# Patient Record
Sex: FEMALE | URBAN - NONMETROPOLITAN AREA
[De-identification: names, ages, dates, MRNs, and addresses within clinical notes are randomized per-mention and may not be internally consistent; named-entity substitution may affect disease eponyms.]

---

## 2022-03-10 ENCOUNTER — OFFICE VISIT (OUTPATIENT)
Dept: URBAN - NONMETROPOLITAN AREA CLINIC 5 | Facility: CLINIC | Age: 72
End: 2022-03-10

## 2022-03-10 VITALS — WEIGHT: 113 LBS | HEART RATE: 96 BPM | HEIGHT: 62 IN | OXYGEN SATURATION: 97 % | BODY MASS INDEX: 20.8 KG/M2

## 2022-03-10 RX ORDER — BUPROPION HYDROCHLORIDE 150 MG/1
1 TABLET IN THE MORNING TABLET, FILM COATED, EXTENDED RELEASE ORAL ONCE A DAY
Status: ACTIVE | COMMUNITY

## 2022-03-10 RX ORDER — DENOSUMAB 60 MG/ML
AS DIRECTED INJECTION SUBCUTANEOUS
Status: ACTIVE | COMMUNITY

## 2022-03-10 RX ORDER — BACILLUS COAGULANS/INULIN 1B-250 MG
AS DIRECTED CAPSULE ORAL
Status: ACTIVE | COMMUNITY

## 2022-03-10 RX ORDER — CHOLECALCIFEROL (VITAMIN D3) 50 MCG
1 TABLET TABLET ORAL ONCE A DAY
Status: ACTIVE | COMMUNITY

## 2022-03-10 RX ORDER — ROSUVASTATIN CALCIUM 20 MG/1
1 TABLET TABLET, FILM COATED ORAL ONCE A DAY
Status: ACTIVE | COMMUNITY

## 2022-03-10 NOTE — HPI-CHANGE IN BOWEL HABITS
71 year old female presents today for a consult for change in bowel habits. Her symptoms have been present for 2 years. She can have diarrhea and then no BM in one day. She states it's the consistency of her BM. She doesn't have a complete BM. Currently, has about 5-6 incomplete BM's a day, stools vary from normal to watery diarrhea no blood, mucus or melena. She is currently taking Metamucil. She has had melena stool, last occurrence was last week. She has had bright red blood in her stool, last occurrence was about a month ago. She is here for a second opinion, she has been diagnosed with celiac disease in 09/2021, she had her last EGD on 03/31/2021 and last colonoscopy with a h/o colon polyps in 2019. her last GI Dr Moni Arellano at [facility].

## 2022-03-29 ENCOUNTER — OFFICE VISIT (OUTPATIENT)
Dept: URBAN - METROPOLITAN AREA CLINIC 35 | Facility: CLINIC | Age: 72
End: 2022-03-29

## 2022-05-12 ENCOUNTER — TELEPHONE ENCOUNTER (OUTPATIENT)
Dept: URBAN - METROPOLITAN AREA CLINIC 36 | Facility: CLINIC | Age: 72
End: 2022-05-12

## 2022-05-17 ENCOUNTER — OFFICE VISIT (OUTPATIENT)
Dept: URBAN - METROPOLITAN AREA CLINIC 35 | Facility: CLINIC | Age: 72
End: 2022-05-17

## 2022-07-19 ENCOUNTER — LAB OUTSIDE AN ENCOUNTER (OUTPATIENT)
Dept: URBAN - METROPOLITAN AREA CLINIC 35 | Facility: CLINIC | Age: 72
End: 2022-07-19

## 2022-07-19 ENCOUNTER — OFFICE VISIT (OUTPATIENT)
Dept: URBAN - METROPOLITAN AREA CLINIC 35 | Facility: CLINIC | Age: 72
End: 2022-07-19
Payer: MEDICARE

## 2022-07-19 VITALS
RESPIRATION RATE: 80 BRPM | DIASTOLIC BLOOD PRESSURE: 78 MMHG | OXYGEN SATURATION: 96 % | HEIGHT: 61 IN | BODY MASS INDEX: 21.6 KG/M2 | WEIGHT: 114.4 LBS | SYSTOLIC BLOOD PRESSURE: 110 MMHG

## 2022-07-19 DIAGNOSIS — Z87.19 HISTORY OF IBS: ICD-10-CM

## 2022-07-19 DIAGNOSIS — K90.0 CELIAC DISEASE: ICD-10-CM

## 2022-07-19 DIAGNOSIS — Z86.010 HX OF COLONIC POLYPS: ICD-10-CM

## 2022-07-19 DIAGNOSIS — K62.5 RECTAL BLEEDING: ICD-10-CM

## 2022-07-19 DIAGNOSIS — K52.9 CHRONIC DIARRHEA: ICD-10-CM

## 2022-07-19 DIAGNOSIS — R63.4 WEIGHT LOSS: ICD-10-CM

## 2022-07-19 PROCEDURE — 99204 OFFICE O/P NEW MOD 45 MIN: CPT | Performed by: INTERNAL MEDICINE

## 2022-07-19 RX ORDER — ALPRAZOLAM 0.25 MG/1
1 TABLET TABLET ORAL
Status: ACTIVE | COMMUNITY

## 2022-07-19 RX ORDER — DENOSUMAB 60 MG/ML
AS DIRECTED INJECTION SUBCUTANEOUS
Status: ACTIVE | COMMUNITY

## 2022-07-19 RX ORDER — ROSUVASTATIN CALCIUM 20 MG/1
1 TABLET TABLET, FILM COATED ORAL ONCE A DAY
Status: ACTIVE | COMMUNITY

## 2022-07-19 RX ORDER — BUPROPION HYDROCHLORIDE 150 MG/1
1 TABLET IN THE MORNING TABLET, FILM COATED, EXTENDED RELEASE ORAL ONCE A DAY
Status: ACTIVE | COMMUNITY

## 2022-07-19 RX ORDER — BACILLUS COAGULANS/INULIN 1B-250 MG
AS DIRECTED CAPSULE ORAL
Status: DISCONTINUED | COMMUNITY

## 2022-07-19 RX ORDER — SODIUM PICOSULFATE, MAGNESIUM OXIDE, AND ANHYDROUS CITRIC ACID 10; 3.5; 12 MG/160ML; G/160ML; G/160ML
160 ML LIQUID ORAL
Qty: 320 MILLILITER | Refills: 0 | OUTPATIENT
Start: 2022-07-19 | End: 2022-07-20

## 2022-07-19 RX ORDER — CHOLECALCIFEROL (VITAMIN D3) 50 MCG
1 TABLET TABLET ORAL ONCE A DAY
Status: ACTIVE | COMMUNITY

## 2022-07-19 NOTE — PHYSICAL EXAM CHEST:
no lesions,  no deformities,  no traumatic injuries, chest wall non-tender,  no masses present, breathing is unlabored without accessory muscle use, normal breath sounds

## 2022-07-19 NOTE — PHYSICAL EXAM NECK/THYROID:
normal appearance , without tenderness upon palpation , no deformities , trachea midline , Thyroid normal size , no thyroid nodules , no masses , no JVD , thyroid nontender

## 2022-07-19 NOTE — HPI-TODAY'S VISIT:
73 y/o female patient presents today for a consultation for celiac disease. She admits a recent/longstanding history of celiac disease. Her most recent labs were completed on 03.31.2021. Symptoms are  abdominal pain diarrhea, constipation, weight loss, irritability  Patient c/o sleep interruption by these symptoms   Denies any family history of colon cancer, esophageal or gastric diseases.   Patient c/o poor emptying with BM's. Bowels movement are between 4 and 8 per day some associated  blood and mucus.  Currently, has about 5-6 incomplete BM's a day, stools vary from normal to watery diarrhea. She is here for a second opinion, she has been diagnosed with celiac disease in 09/2021, she had her last EGD on 03/31/2021 and last colonoscopy with a h/o colon polyps in 2019. Her last GI Dr Moni Arellano at [facility].   GI MD Note: patient has a diagnosis of celiac disease. On presentation she was having weight loss, approximately 10 #. Patient was diagnosed  also with IBS many years ago. Mother also had IBS with bouts of postprandial diarrhea. The difference is that for the last 2 yrs she has been having nocturnal BM's with loose stools. This is happening every night. Mild intermittent crampy abdominal discomfort; not often. She does have loose BM's during the day. At night she has 4-6 BM's a day. Occasional blood in stool, sometimes in toilet bowl and sometimes just on tissue. She does have hemorrhoids. No N/V No GERD symptoms No dysphagia Pruritus at night but no rash No J/AS/DU No melena or hematemesis. Occasional NSAIDs No PPI Hx of colon polyps, last colon 2019. Has had multiple colonoscopies for hx of colon polyps.

## 2022-07-19 NOTE — PHYSICAL EXAM SKIN:
no rashes , no suspicious lesions , no areas of discoloration , no jaundice present , good turgor , no masses , no tenderness on palpation

## 2022-07-19 NOTE — PHYSICAL EXAM CONSTITUTIONAL:
well developed, well nourished , in no acute distress , ambulating without difficulty , normal communication ability

## 2022-08-02 ENCOUNTER — LAB OUTSIDE AN ENCOUNTER (OUTPATIENT)
Dept: URBAN - METROPOLITAN AREA CLINIC 35 | Facility: CLINIC | Age: 72
End: 2022-08-02

## 2022-08-08 ENCOUNTER — CLAIMS CREATED FROM THE CLAIM WINDOW (OUTPATIENT)
Dept: URBAN - METROPOLITAN AREA CLINIC 4 | Facility: CLINIC | Age: 72
End: 2022-08-08
Payer: MEDICARE

## 2022-08-08 ENCOUNTER — OFFICE VISIT (OUTPATIENT)
Dept: URBAN - METROPOLITAN AREA SURGERY CENTER 8 | Facility: SURGERY CENTER | Age: 72
End: 2022-08-08
Payer: MEDICARE

## 2022-08-08 DIAGNOSIS — K90.0 ACD (ADULT CELIAC DISEASE): ICD-10-CM

## 2022-08-08 DIAGNOSIS — K63.89 OTHER SPECIFIED DISEASES OF INTESTINE: ICD-10-CM

## 2022-08-08 DIAGNOSIS — K29.80 ACUTE DUODENITIS: ICD-10-CM

## 2022-08-08 DIAGNOSIS — K52.832 LYMPHOCYTIC  COLITIS: ICD-10-CM

## 2022-08-08 DIAGNOSIS — D12.2 ADENOMA OF ASCENDING COLON: ICD-10-CM

## 2022-08-08 DIAGNOSIS — D12.3 ADENOMA OF TRANSVERSE COLON: ICD-10-CM

## 2022-08-08 DIAGNOSIS — K52.832 LYMPHOCYTIC COLITIS: ICD-10-CM

## 2022-08-08 DIAGNOSIS — K29.81 DUODENITIS WITH BLEEDING: ICD-10-CM

## 2022-08-08 DIAGNOSIS — K29.60 ADENOPAPILLOMATOSIS GASTRICA: ICD-10-CM

## 2022-08-08 DIAGNOSIS — R19.7 ACUTE DIARRHEA: ICD-10-CM

## 2022-08-08 DIAGNOSIS — D12.2 BENIGN NEOPLASM OF ASCENDING COLON: ICD-10-CM

## 2022-08-08 DIAGNOSIS — D12.3 BENIGN NEOPLASM OF TRANSVERSE COLON: ICD-10-CM

## 2022-08-08 DIAGNOSIS — K31.A0 GASTRIC INTESTINAL METAPLASIA, UNSPECIFIED: ICD-10-CM

## 2022-08-08 PROCEDURE — 45380 COLONOSCOPY AND BIOPSY: CPT | Performed by: INTERNAL MEDICINE

## 2022-08-08 PROCEDURE — 43239 EGD BIOPSY SINGLE/MULTIPLE: CPT | Performed by: INTERNAL MEDICINE

## 2022-08-08 PROCEDURE — 88313 SPECIAL STAINS GROUP 2: CPT | Performed by: PATHOLOGY

## 2022-08-08 PROCEDURE — 88342 IMHCHEM/IMCYTCHM 1ST ANTB: CPT | Performed by: PATHOLOGY

## 2022-08-08 PROCEDURE — G8907 PT DOC NO EVENTS ON DISCHARG: HCPCS | Performed by: INTERNAL MEDICINE

## 2022-08-08 PROCEDURE — 88305 TISSUE EXAM BY PATHOLOGIST: CPT | Performed by: PATHOLOGY

## 2022-08-09 LAB
C. DIFFICILE TOXIN A/B, STOOL - QDX: (no result)
CALPROTECTIN, STOOL - QDX: (no result)
FECAL FAT, QUALITATIVE: (no result)
GASTROINTESTINAL PATHOGEN: (no result)
OVA AND PARASITE - QDX: (no result)
PANCREATICELASTASE ELISA, STOOL: (no result)

## 2022-08-11 ENCOUNTER — TELEPHONE ENCOUNTER (OUTPATIENT)
Dept: URBAN - METROPOLITAN AREA CLINIC 36 | Facility: CLINIC | Age: 72
End: 2022-08-11

## 2022-08-15 PROBLEM — 31437008 LYMPHOCYTIC COLITIS: Status: ACTIVE | Noted: 2022-08-15

## 2022-09-01 ENCOUNTER — OFFICE VISIT (OUTPATIENT)
Dept: URBAN - METROPOLITAN AREA CLINIC 33 | Facility: CLINIC | Age: 72
End: 2022-09-01
Payer: MEDICARE

## 2022-09-01 VITALS
SYSTOLIC BLOOD PRESSURE: 118 MMHG | WEIGHT: 115 LBS | OXYGEN SATURATION: 96 % | DIASTOLIC BLOOD PRESSURE: 78 MMHG | BODY MASS INDEX: 21.71 KG/M2 | HEART RATE: 110 BPM | HEIGHT: 61 IN

## 2022-09-01 DIAGNOSIS — K52.832 LYMPHOCYTIC COLITIS: ICD-10-CM

## 2022-09-01 DIAGNOSIS — K29.70 INFLAMMATION PRESENT ON BIOPSY OF STOMACH: ICD-10-CM

## 2022-09-01 DIAGNOSIS — Z86.010 HX OF ADENOMATOUS COLONIC POLYPS: ICD-10-CM

## 2022-09-01 DIAGNOSIS — K90.0 CELIAC DISEASE: ICD-10-CM

## 2022-09-01 DIAGNOSIS — K52.9 CHRONIC DIARRHEA: ICD-10-CM

## 2022-09-01 PROBLEM — 4556007: Status: ACTIVE | Noted: 2022-09-01

## 2022-09-01 PROBLEM — 429047008: Status: ACTIVE | Noted: 2022-09-01

## 2022-09-01 PROCEDURE — 99214 OFFICE O/P EST MOD 30 MIN: CPT | Performed by: INTERNAL MEDICINE

## 2022-09-01 RX ORDER — DENOSUMAB 60 MG/ML
AS DIRECTED INJECTION SUBCUTANEOUS
Status: ACTIVE | COMMUNITY

## 2022-09-01 RX ORDER — CHOLECALCIFEROL (VITAMIN D3) 50 MCG
1 TABLET TABLET ORAL ONCE A DAY
Status: ACTIVE | COMMUNITY

## 2022-09-01 RX ORDER — BUPROPION HYDROCHLORIDE 150 MG/1
1 TABLET IN THE MORNING TABLET, FILM COATED, EXTENDED RELEASE ORAL BID
Status: ACTIVE | COMMUNITY

## 2022-09-01 RX ORDER — ALPRAZOLAM 0.25 MG/1
1 TABLET TABLET ORAL
Status: ACTIVE | COMMUNITY

## 2022-09-01 RX ORDER — ROSUVASTATIN CALCIUM 10 MG/1
1 TABLET TABLET, FILM COATED ORAL ONCE A DAY
Status: ACTIVE | COMMUNITY

## 2022-09-01 RX ORDER — BUDESONIDE 3 MG/1
3 CAPSULES CAPSULE, COATED PELLETS ORAL ONCE A DAY
Qty: 270 CAPSULE | Refills: 0 | OUTPATIENT
Start: 2022-09-01

## 2022-09-01 NOTE — HPI-TODAY'S VISIT:
Patient presents today to review her colonoscopy and endoscopy results. She denies any complications after her procedure.   Since her procedure she admits/denies any episodes of dysphagia, a globus sensation, a change in appetite or bowel habits.   Currently reports 3-6 bowel movement per day, with strain. Her stools alternate between formed with the first movement and the looser which each movement following. She admit mucus present and denies melena or blood in stools.   Admits pruritus ani at night she states that she feels itching all over not just rectal itching. She denies rectal pain.  EGD (8/8/2022)  Impression:  - Z-line regular, 37 cm from the incisors. - Normal esophagus. - Erythematous mucosa in the stomach.  Biopsied. - Erythematous duodenopathy.  Biopsied  Pathology:  1.) Duodenum, Second Part (D2), Biopsy: PEPTIC DUODENITIS.  No Evidence of Infectious Organisms or Sprue.  Negative for Dysplasia or Malignancy.  2.) Stomach, Antrum and Body, Biopsy: CHRONIC INACTIVE GASTRITIS WITH HISTOLOGICAL CHANGES SUGGESTIVE OF              TREATED H. PYLORI GASTRITIS.  See Comment.   No Evidence of H. Pylori Organisms or Intestinal Metaplasia.  Negative For Dysplasia or Malignancy.   COMMENT: The biopsy shows band-like superficial and interstitial deeper lymphoplasmacytic infiltrate and variable presence of some lymphoid follicles, without acute inflammation.  No Helicobacter organisms are identified on H&E and special stains.  The histologic findings may represent past H. pylori gastritis treated with antibiotics and/or proton-pump inhibitors.  Non-invasive test (such as urea breath test, fecal antigen detection or serum antibody) might be helpful to completely exclude H. pylori infection if clinically indicated.  Colonoscopy (8/8/2022)  Impression:  - Perianal skin tags found on perianal exam. - The examined portion of the ileum was normal.  Biopsied. - Normal mucosa in the entire examined colon.  Biopsied. - Six 2 to 4 mm polyps in the rectum, at the hepatic flexure and in the proximal ascending colon, removed with a cold biopsy forceps.  Resected and retrieved. - Tortuous sigmoid colon. - Diverticulosis in the sigmoid colon, in the descending colon and in the transverse colon. - Medium-sized lipoma in the distal ascending colon. - Non-bleeding internal hemorrhoids    Pathology:  1.) Colon, Ascending, Biopsy: LYMPHOCYTIC COLITIS. Negative for Infectious Organisms, Dysplasia or Malignancy. 2.) Colon, Descending, Biopsy: LYMPHOCYTIC COLITIS. Negative for Infectious Organisms, Dysplasia or Malignancy. 3.) Colon, Ascending, Polypectomy: NO SIGNIFICANT ABNORMALITY. 4.) Colon, Ascending, Polypectomy: TUBULAR ADENOMA(S). 5.) Colon, Hepatic Flexure, Polypectomy: TUBULAR ADENOMA(S).

## 2022-09-16 ENCOUNTER — TELEPHONE ENCOUNTER (OUTPATIENT)
Dept: URBAN - METROPOLITAN AREA CLINIC 36 | Facility: CLINIC | Age: 72
End: 2022-09-16

## 2022-09-16 RX ORDER — BUDESONIDE 3 MG/1
3 CAPSULES CAPSULE, COATED PELLETS ORAL ONCE A DAY
Qty: 270 CAPSULE | Refills: 0
Start: 2022-09-01

## 2022-11-08 ENCOUNTER — OFFICE VISIT (OUTPATIENT)
Dept: URBAN - METROPOLITAN AREA CLINIC 35 | Facility: CLINIC | Age: 72
End: 2022-11-08

## 2022-12-14 ENCOUNTER — OFFICE VISIT (OUTPATIENT)
Dept: URBAN - METROPOLITAN AREA CLINIC 35 | Facility: CLINIC | Age: 72
End: 2022-12-14
Payer: MEDICARE

## 2022-12-14 VITALS
SYSTOLIC BLOOD PRESSURE: 116 MMHG | HEART RATE: 83 BPM | WEIGHT: 115 LBS | HEIGHT: 61 IN | BODY MASS INDEX: 21.71 KG/M2 | DIASTOLIC BLOOD PRESSURE: 64 MMHG | OXYGEN SATURATION: 97 %

## 2022-12-14 DIAGNOSIS — K52.832 LYMPHOCYTIC COLITIS: ICD-10-CM

## 2022-12-14 DIAGNOSIS — K90.0 CELIAC DISEASE: ICD-10-CM

## 2022-12-14 DIAGNOSIS — F41.8 SITUATIONAL ANXIETY: ICD-10-CM

## 2022-12-14 PROBLEM — 48694002: Status: ACTIVE | Noted: 2022-12-14

## 2022-12-14 PROBLEM — 396331005: Status: ACTIVE | Noted: 2022-07-19

## 2022-12-14 PROCEDURE — 99214 OFFICE O/P EST MOD 30 MIN: CPT | Performed by: INTERNAL MEDICINE

## 2022-12-14 RX ORDER — CHOLECALCIFEROL (VITAMIN D3) 50 MCG
1 TABLET TABLET ORAL ONCE A DAY
Status: ACTIVE | COMMUNITY

## 2022-12-14 RX ORDER — BUPROPION HYDROCHLORIDE 150 MG/1
1 TABLET IN THE MORNING TABLET, FILM COATED, EXTENDED RELEASE ORAL BID
Status: ACTIVE | COMMUNITY

## 2022-12-14 RX ORDER — ROSUVASTATIN CALCIUM 10 MG/1
1 TABLET TABLET, FILM COATED ORAL ONCE A DAY
Status: ACTIVE | COMMUNITY

## 2022-12-14 RX ORDER — ALPRAZOLAM 0.25 MG/1
1 TABLET TABLET ORAL
Status: ACTIVE | COMMUNITY

## 2022-12-14 RX ORDER — DENOSUMAB 60 MG/ML
AS DIRECTED INJECTION SUBCUTANEOUS
Status: ACTIVE | COMMUNITY

## 2022-12-14 RX ORDER — BUDESONIDE 3 MG/1
3 CAPSULES CAPSULE, COATED PELLETS ORAL ONCE A DAY
Qty: 270 CAPSULE | Refills: 0 | Status: ACTIVE | COMMUNITY
Start: 2022-09-01

## 2022-12-14 NOTE — HPI-TODAY'S VISIT:
72 Year old female patient presents today for 2 months follow up. She admits the continued use of Budesonide 9 mg and will start decreasing dosage as indicated. She admits improvement of symptoms (diarrhea), however, now that she has tapered down to one tablet a day (3 mg), her symptoms are returning. She also mentioned that she feels that she is having side effects from Budesonide (anger).  Currently, reports four to five BMs a day without strain, but those will happen in AM,  within 1.5 hours, then done for the day. Stools are formed Denies the presence of blood, mucus, nor melena.    Patient has not continued gluten free diet but is having a hard time with it.  Patient is under a lot of stress with the sickness of her  and feels this may be a large part of it;  patient states: "sometimes anger comes from fear, and I am afraid for my ."

## 2022-12-21 ENCOUNTER — OFFICE VISIT (OUTPATIENT)
Dept: URBAN - METROPOLITAN AREA TELEHEALTH 2 | Facility: TELEHEALTH | Age: 72
End: 2022-12-21
Payer: MEDICARE

## 2022-12-21 DIAGNOSIS — K52.832 LYMPHOCYTIC COLITIS: ICD-10-CM

## 2022-12-21 DIAGNOSIS — K90.0 ACD (ADULT CELIAC DISEASE): ICD-10-CM

## 2022-12-21 PROCEDURE — 97802 MEDICAL NUTRITION INDIV IN: CPT | Performed by: DIETITIAN, REGISTERED

## 2022-12-21 RX ORDER — ROSUVASTATIN CALCIUM 10 MG/1
1 TABLET TABLET, FILM COATED ORAL ONCE A DAY
Status: ACTIVE | COMMUNITY

## 2022-12-21 RX ORDER — CHOLECALCIFEROL (VITAMIN D3) 50 MCG
1 TABLET TABLET ORAL ONCE A DAY
Status: ACTIVE | COMMUNITY

## 2022-12-21 RX ORDER — ALPRAZOLAM 0.25 MG/1
1 TABLET TABLET ORAL
Status: ACTIVE | COMMUNITY

## 2022-12-21 RX ORDER — BUDESONIDE 3 MG/1
3 CAPSULES CAPSULE, COATED PELLETS ORAL ONCE A DAY
Qty: 270 CAPSULE | Refills: 0 | Status: ACTIVE | COMMUNITY
Start: 2022-09-01

## 2022-12-21 RX ORDER — BUPROPION HYDROCHLORIDE 150 MG/1
1 TABLET IN THE MORNING TABLET, FILM COATED, EXTENDED RELEASE ORAL BID
Status: ACTIVE | COMMUNITY

## 2022-12-21 RX ORDER — DENOSUMAB 60 MG/ML
AS DIRECTED INJECTION SUBCUTANEOUS
Status: ACTIVE | COMMUNITY

## 2022-12-30 PROBLEM — 1187071008: Status: ACTIVE | Noted: 2022-09-01

## 2023-03-14 ENCOUNTER — OFFICE VISIT (OUTPATIENT)
Dept: URBAN - METROPOLITAN AREA CLINIC 35 | Facility: CLINIC | Age: 73
End: 2023-03-14
Payer: MEDICARE

## 2023-03-14 VITALS
DIASTOLIC BLOOD PRESSURE: 66 MMHG | HEIGHT: 61 IN | HEART RATE: 86 BPM | WEIGHT: 111 LBS | SYSTOLIC BLOOD PRESSURE: 128 MMHG | OXYGEN SATURATION: 98 % | BODY MASS INDEX: 20.96 KG/M2

## 2023-03-14 DIAGNOSIS — K52.832 LYMPHOCYTIC COLITIS: ICD-10-CM

## 2023-03-14 DIAGNOSIS — K90.0 CELIAC DISEASE: ICD-10-CM

## 2023-03-14 DIAGNOSIS — K58.0 IRRITABLE BOWEL SYNDROME WITH DIARRHEA: ICD-10-CM

## 2023-03-14 DIAGNOSIS — F41.8 SITUATIONAL ANXIETY: ICD-10-CM

## 2023-03-14 PROBLEM — 197125005: Status: ACTIVE | Noted: 2023-03-14

## 2023-03-14 PROCEDURE — 99214 OFFICE O/P EST MOD 30 MIN: CPT | Performed by: INTERNAL MEDICINE

## 2023-03-14 RX ORDER — ROSUVASTATIN CALCIUM 10 MG/1
1 TABLET TABLET, FILM COATED ORAL ONCE A DAY
Status: ACTIVE | COMMUNITY

## 2023-03-14 RX ORDER — ALPRAZOLAM 0.25 MG/1
1 TABLET TABLET ORAL
Status: ACTIVE | COMMUNITY

## 2023-03-14 RX ORDER — DENOSUMAB 60 MG/ML
AS DIRECTED INJECTION SUBCUTANEOUS
Status: ACTIVE | COMMUNITY

## 2023-03-14 RX ORDER — BUDESONIDE 3 MG/1
3 CAPSULES CAPSULE, COATED PELLETS ORAL ONCE A DAY
Qty: 270 CAPSULE | Refills: 0 | Status: ON HOLD | COMMUNITY
Start: 2022-09-01

## 2023-03-14 RX ORDER — BUPROPION HYDROCHLORIDE 150 MG/1
1 TABLET IN THE MORNING TABLET, FILM COATED, EXTENDED RELEASE ORAL BID
Status: ACTIVE | COMMUNITY

## 2023-03-14 RX ORDER — CHOLECALCIFEROL (VITAMIN D3) 50 MCG
1 TABLET TABLET ORAL ONCE A DAY
Status: ACTIVE | COMMUNITY

## 2023-03-14 RX ORDER — RIFAXIMIN 550 MG/1
1 TABLET TABLET ORAL THREE TIMES A DAY
Qty: 42 TABLET | Refills: 1 | OUTPATIENT
Start: 2023-03-14 | End: 2023-04-11

## 2023-03-14 NOTE — HPI-TODAY'S VISIT:
72-Year-old female patient presents today for 3 months follow up. Patient admits consulting with GI Nutritionist, Bianca Murillo, and adhering to gluten free diet, she has not noticed difference in celiac symptoms. She admits completing Budesonide treatment, which provided relief while she was on medication. Her symptoms of fecal urgency during the night have returned.  She denies the use of Imodium. Patient states she wakes up during the middle of night with the urge of wanting to have a bowel movement without being able to. She admits she then gets up at 5 am, drinks a cup of coffee and then continues to have a bowel movement. Currently, reports 4-5 bowel movements a day, at one sitting, without strain. Admits fecal urgency in the morning and constipation in the evening. Stools vary between semi firm, loose and watery. Admits one episode of blood present in stool. Blood was bright red in a considerate amount. Denies the presence of mucus, nor melena.   Patient finished Budesonide mid December 2022. BM's starts with solid consistency, and progress to diarrhea.

## 2023-05-16 ENCOUNTER — OFFICE VISIT (OUTPATIENT)
Dept: URBAN - METROPOLITAN AREA CLINIC 35 | Facility: CLINIC | Age: 73
End: 2023-05-16

## 2023-07-26 ENCOUNTER — OFFICE VISIT (OUTPATIENT)
Dept: URBAN - METROPOLITAN AREA CLINIC 35 | Facility: CLINIC | Age: 73
End: 2023-07-26
Payer: MEDICARE

## 2023-07-26 VITALS
OXYGEN SATURATION: 97 % | HEIGHT: 61 IN | WEIGHT: 114 LBS | BODY MASS INDEX: 21.52 KG/M2 | HEART RATE: 77 BPM | SYSTOLIC BLOOD PRESSURE: 118 MMHG | DIASTOLIC BLOOD PRESSURE: 70 MMHG

## 2023-07-26 DIAGNOSIS — K52.832 LYMPHOCYTIC COLITIS: ICD-10-CM

## 2023-07-26 DIAGNOSIS — K90.0 CELIAC DISEASE: ICD-10-CM

## 2023-07-26 DIAGNOSIS — F41.8 SITUATIONAL ANXIETY: ICD-10-CM

## 2023-07-26 DIAGNOSIS — K58.0 IRRITABLE BOWEL SYNDROME WITH DIARRHEA: ICD-10-CM

## 2023-07-26 PROCEDURE — 99214 OFFICE O/P EST MOD 30 MIN: CPT | Performed by: INTERNAL MEDICINE

## 2023-07-26 RX ORDER — NALTREXONE HYDROCHLORIDE 50 MG/1
1 TABLET TABLET, FILM COATED ORAL ONCE A DAY
Status: ACTIVE | COMMUNITY

## 2023-07-26 RX ORDER — CHOLECALCIFEROL (VITAMIN D3) 50 MCG
1 TABLET TABLET ORAL ONCE A DAY
Status: ACTIVE | COMMUNITY

## 2023-07-26 RX ORDER — BUPROPION HYDROCHLORIDE 150 MG/1
1 TABLET IN THE MORNING TABLET, FILM COATED, EXTENDED RELEASE ORAL BID
Status: ACTIVE | COMMUNITY

## 2023-07-26 RX ORDER — DENOSUMAB 60 MG/ML
AS DIRECTED INJECTION SUBCUTANEOUS
Status: ACTIVE | COMMUNITY

## 2023-07-26 RX ORDER — BUDESONIDE 3 MG/1
3 CAPSULES CAPSULE, COATED PELLETS ORAL ONCE A DAY
Qty: 270 CAPSULE | Refills: 0 | Status: ON HOLD | COMMUNITY
Start: 2022-09-01

## 2023-07-26 RX ORDER — ROSUVASTATIN CALCIUM 10 MG/1
1 TABLET TABLET, FILM COATED ORAL ONCE A DAY
Status: ACTIVE | COMMUNITY

## 2023-07-26 RX ORDER — ALPRAZOLAM 0.25 MG/1
1 TABLET TABLET ORAL
Status: ACTIVE | COMMUNITY

## 2023-07-26 RX ORDER — TRAZODONE HYDROCHLORIDE 50 MG/1
1 TABLET AT BEDTIME AS NEEDED TABLET ORAL ONCE A DAY
Status: ACTIVE | COMMUNITY

## 2023-07-26 NOTE — HPI-TODAY'S VISIT:
73 Year old Female patient present today for follow up. Hx of Lymphocytic colitis. Overall she is better. Stools are more formed, but still having 4-6 BM's in AM after coffee. No symptoms at night. Sometimes (rarely) will feel in AM she has to have a BM, but can't.  She did a 2-week course of Xifaxan, but did not feel it helped much. Overall though she is better. Still under a lot of familiar stress still , with  illness.

## 2023-11-28 ENCOUNTER — OFFICE VISIT (OUTPATIENT)
Dept: URBAN - METROPOLITAN AREA CLINIC 35 | Facility: CLINIC | Age: 73
End: 2023-11-28

## 2023-12-12 ENCOUNTER — OFFICE VISIT (OUTPATIENT)
Dept: URBAN - METROPOLITAN AREA CLINIC 35 | Facility: CLINIC | Age: 73
End: 2023-12-12
Payer: MEDICARE

## 2023-12-12 ENCOUNTER — DASHBOARD ENCOUNTERS (OUTPATIENT)
Age: 73
End: 2023-12-12

## 2023-12-12 VITALS
BODY MASS INDEX: 20.77 KG/M2 | OXYGEN SATURATION: 95 % | HEIGHT: 61 IN | DIASTOLIC BLOOD PRESSURE: 66 MMHG | SYSTOLIC BLOOD PRESSURE: 118 MMHG | WEIGHT: 110 LBS | HEART RATE: 76 BPM

## 2023-12-12 DIAGNOSIS — K58.0 IRRITABLE BOWEL SYNDROME WITH DIARRHEA: ICD-10-CM

## 2023-12-12 DIAGNOSIS — K90.0 CELIAC DISEASE: ICD-10-CM

## 2023-12-12 DIAGNOSIS — K52.832 LYMPHOCYTIC COLITIS: ICD-10-CM

## 2023-12-12 DIAGNOSIS — F41.8 SITUATIONAL ANXIETY: ICD-10-CM

## 2023-12-12 PROCEDURE — 99213 OFFICE O/P EST LOW 20 MIN: CPT | Performed by: INTERNAL MEDICINE

## 2023-12-12 RX ORDER — TRAZODONE HYDROCHLORIDE 50 MG/1
1 TABLET AT BEDTIME AS NEEDED TABLET ORAL ONCE A DAY
Status: ACTIVE | COMMUNITY

## 2023-12-12 RX ORDER — BUPROPION HYDROCHLORIDE 150 MG/1
1 TABLET IN THE MORNING TABLET, FILM COATED, EXTENDED RELEASE ORAL BID
Status: ACTIVE | COMMUNITY

## 2023-12-12 RX ORDER — DENOSUMAB 60 MG/ML
AS DIRECTED INJECTION SUBCUTANEOUS
Status: ACTIVE | COMMUNITY

## 2023-12-12 RX ORDER — BUDESONIDE 3 MG/1
3 CAPSULES CAPSULE, COATED PELLETS ORAL ONCE A DAY
Qty: 270 CAPSULE | Refills: 0 | Status: ON HOLD | COMMUNITY
Start: 2022-09-01

## 2023-12-12 RX ORDER — ROSUVASTATIN CALCIUM 10 MG/1
1 TABLET TABLET, FILM COATED ORAL ONCE A DAY
Status: ACTIVE | COMMUNITY

## 2023-12-12 RX ORDER — ALPRAZOLAM 0.25 MG/1
1 TABLET TABLET ORAL
Status: ACTIVE | COMMUNITY

## 2023-12-12 RX ORDER — CHOLECALCIFEROL (VITAMIN D3) 50 MCG
1 TABLET TABLET ORAL ONCE A DAY
Status: ACTIVE | COMMUNITY

## 2023-12-12 RX ORDER — NALTREXONE HYDROCHLORIDE 50 MG/1
1 TABLET TABLET, FILM COATED ORAL ONCE A DAY
Status: ACTIVE | COMMUNITY

## 2023-12-12 NOTE — HPI-FOLLOW UP VISIT
Patient presents today for a four-month follow-up. Hx of Lymphocytic colitis, celiac disease, and IBS.  She denies any associated symptoms at this time.  Patient Celiac Disease Comprehensive:   Deamidated Gliadin Abs, IgA  (HIGH) TTG IgA Negative Endomysial AB Negative  HLA typing pending   Patient continues to be on a gluten free diet, she is also on a fiber supplement, having 4-5 BM, soft stools. Non in middle of night.  She has a hard time maintaining weight on gluten free diet.

## 2023-12-14 LAB
ADDITIONAL INFORMATION:: (no result)
COMMENT:: (no result)
DEAMIDATED GLIADIN ABS, IGA: 20
DEAMIDATED GLIADIN ABS, IGG: 13
DQ2 (DQA1 0501/0505,DQB1 02XX): POSITIVE
DQ8 (DQA1 03XX, DQB1 0302): NEGATIVE
ENDOMYSIAL ANTIBODY IGA: NEGATIVE
IMMUNOGLOBULIN A, QN, SERUM: 135
T-TRANSGLUTAMINASE (TTG) IGA: 2
T-TRANSGLUTAMINASE (TTG) IGG: <2

## 2024-06-04 ENCOUNTER — OFFICE VISIT (OUTPATIENT)
Dept: URBAN - METROPOLITAN AREA CLINIC 35 | Facility: CLINIC | Age: 74
End: 2024-06-04

## 2024-06-04 ENCOUNTER — OFFICE VISIT (OUTPATIENT)
Dept: URBAN - METROPOLITAN AREA CLINIC 35 | Facility: CLINIC | Age: 74
End: 2024-06-04
Payer: MEDICARE

## 2024-06-04 VITALS
HEIGHT: 61 IN | BODY MASS INDEX: 20.96 KG/M2 | OXYGEN SATURATION: 96 % | SYSTOLIC BLOOD PRESSURE: 118 MMHG | DIASTOLIC BLOOD PRESSURE: 78 MMHG | HEART RATE: 64 BPM | WEIGHT: 111 LBS

## 2024-06-04 DIAGNOSIS — Z86.010 HISTORY OF ADENOMATOUS POLYP OF COLON: ICD-10-CM

## 2024-06-04 DIAGNOSIS — K90.0 CELIAC DISEASE: ICD-10-CM

## 2024-06-04 DIAGNOSIS — K58.0 IRRITABLE BOWEL SYNDROME WITH DIARRHEA: ICD-10-CM

## 2024-06-04 DIAGNOSIS — K52.832 LYMPHOCYTIC COLITIS: ICD-10-CM

## 2024-06-04 PROBLEM — 429047008: Status: ACTIVE | Noted: 2024-06-04

## 2024-06-04 PROCEDURE — 99213 OFFICE O/P EST LOW 20 MIN: CPT | Performed by: PHYSICIAN ASSISTANT

## 2024-06-04 RX ORDER — BUPROPION HYDROCHLORIDE 150 MG/1
1 TABLET IN THE MORNING TABLET, FILM COATED, EXTENDED RELEASE ORAL BID
Status: ACTIVE | COMMUNITY

## 2024-06-04 RX ORDER — NALTREXONE HYDROCHLORIDE 50 MG/1
1 TABLET TABLET, FILM COATED ORAL ONCE A DAY
Status: ACTIVE | COMMUNITY

## 2024-06-04 RX ORDER — DENOSUMAB 60 MG/ML
AS DIRECTED INJECTION SUBCUTANEOUS
Status: ON HOLD | COMMUNITY

## 2024-06-04 RX ORDER — BUDESONIDE 3 MG/1
3 CAPSULES CAPSULE, COATED PELLETS ORAL ONCE A DAY
Qty: 270 CAPSULE | Refills: 0 | Status: ON HOLD | COMMUNITY
Start: 2022-09-01

## 2024-06-04 RX ORDER — TRAZODONE HYDROCHLORIDE 50 MG/1
1 TABLET AT BEDTIME AS NEEDED TABLET ORAL ONCE A DAY
Status: ACTIVE | COMMUNITY

## 2024-06-04 RX ORDER — ROSUVASTATIN 10 MG/1
1 TABLET TABLET, FILM COATED ORAL ONCE A DAY
Status: ACTIVE | COMMUNITY

## 2024-06-04 RX ORDER — CHOLECALCIFEROL (VITAMIN D3) 50 MCG
1 TABLET TABLET ORAL ONCE A DAY
Status: ACTIVE | COMMUNITY

## 2024-06-04 RX ORDER — ALPRAZOLAM 0.25 MG/1
1 TABLET TABLET ORAL
Status: ACTIVE | COMMUNITY

## 2024-06-04 NOTE — HPI-FOLLOW UP VISIT
Patient presents today for a 6 month follow up. She currently admits 4-5 bowel movements per day in the mornings and having incomplete elminations (intermittent) strain. Stools are  intermittent with normal to loose. She admits continuing to follow a gluten free diet. Patient stated that she see's bright bright blood from rectum only when she eats gluten free foods. She struggles to avoid gluten but does her best. She did not feel Budesonide helped her diarrhea.   Last visit: Patient presents today for a four-month follow-up. Hx of Lymphocytic colitis, celiac disease, and IBS.  She denies any associated symptoms at this time.  Patient Celiac Disease Comprehensive:   Deamidated Gliadin Abs, IgA  (HIGH) TTG IgA Negative Endomysial AB Negative  HLA typing pending   Patient continues to be on a gluten free diet, she is also on a fiber supplement, having 4-5 BM, soft stools. Non in middle of night.  She has a hard time maintaining weight on gluten free diet.

## 2024-08-07 ENCOUNTER — OFFICE VISIT (OUTPATIENT)
Dept: URBAN - METROPOLITAN AREA CLINIC 35 | Facility: CLINIC | Age: 74
End: 2024-08-07
Payer: MEDICARE

## 2024-08-07 VITALS
HEART RATE: 75 BPM | DIASTOLIC BLOOD PRESSURE: 80 MMHG | BODY MASS INDEX: 21.34 KG/M2 | OXYGEN SATURATION: 97 % | HEIGHT: 61 IN | WEIGHT: 113 LBS | SYSTOLIC BLOOD PRESSURE: 110 MMHG

## 2024-08-07 DIAGNOSIS — F41.8 SITUATIONAL ANXIETY: ICD-10-CM

## 2024-08-07 DIAGNOSIS — K62.5 RECTUM BLEEDING: ICD-10-CM

## 2024-08-07 DIAGNOSIS — Z86.010 HISTORY OF ADENOMATOUS POLYP OF COLON: ICD-10-CM

## 2024-08-07 DIAGNOSIS — K64.8 INTERNAL HEMORRHOIDS: ICD-10-CM

## 2024-08-07 DIAGNOSIS — K52.832 LYMPHOCYTIC COLITIS: ICD-10-CM

## 2024-08-07 DIAGNOSIS — K58.0 IRRITABLE BOWEL SYNDROME WITH DIARRHEA: ICD-10-CM

## 2024-08-07 DIAGNOSIS — K90.0 CELIAC DISEASE: ICD-10-CM

## 2024-08-07 PROCEDURE — 99214 OFFICE O/P EST MOD 30 MIN: CPT | Performed by: INTERNAL MEDICINE

## 2024-08-07 RX ORDER — BUPROPION HYDROCHLORIDE 150 MG/1
1 TABLET IN THE MORNING TABLET, FILM COATED, EXTENDED RELEASE ORAL BID
Status: ACTIVE | COMMUNITY

## 2024-08-07 RX ORDER — NALTREXONE HYDROCHLORIDE 50 MG/1
1 TABLET TABLET, FILM COATED ORAL ONCE A DAY
Status: ACTIVE | COMMUNITY

## 2024-08-07 RX ORDER — DENOSUMAB 60 MG/ML
AS DIRECTED INJECTION SUBCUTANEOUS
Status: ON HOLD | COMMUNITY

## 2024-08-07 RX ORDER — ESCITALOPRAM OXALATE 10 MG/1
1 TABLET TABLET ORAL ONCE A DAY
Status: ACTIVE | COMMUNITY

## 2024-08-07 RX ORDER — HYDROCORTISONE ACETATE 25 MG/1
1 SUPPOSITORY SUPPOSITORY RECTAL TWICE A DAY
Qty: 28 | Refills: 1 | OUTPATIENT
Start: 2024-08-07 | End: 2024-09-04

## 2024-08-07 RX ORDER — TRAZODONE HYDROCHLORIDE 50 MG/1
1 TABLET AT BEDTIME AS NEEDED TABLET ORAL ONCE A DAY
Status: ACTIVE | COMMUNITY

## 2024-08-07 RX ORDER — ALPRAZOLAM 0.25 MG/1
1 TABLET TABLET ORAL
Status: ACTIVE | COMMUNITY

## 2024-08-07 RX ORDER — BUDESONIDE 3 MG/1
3 CAPSULES CAPSULE, COATED PELLETS ORAL ONCE A DAY
Qty: 270 CAPSULE | Refills: 0 | Status: ON HOLD | COMMUNITY
Start: 2022-09-01

## 2024-08-07 RX ORDER — ROSUVASTATIN 10 MG/1
1 TABLET TABLET, FILM COATED ORAL ONCE A DAY
Status: ACTIVE | COMMUNITY

## 2024-08-07 RX ORDER — CHOLECALCIFEROL (VITAMIN D3) 50 MCG
1 TABLET TABLET ORAL ONCE A DAY
Status: ACTIVE | COMMUNITY

## 2024-08-07 NOTE — HPI-TODAY'S VISIT:
74-year-old female patient with previous history of Lymphocytic colitis, Celiac disease, IBS-D, situational anxiety, adenomatous colonic polyps presents today for her 2-month follow-up. Patient mentions she is not taking Citrucel as prescribed. Patient mentions occasional episodes of painless blood in stool. Patient currently mentions 5-7 bowel movement per day with solid to liquid consistency without pain or melena with occasional bright red blood.    Back on July, she developed a very painful "canker sore" in mouth. She was wondering if it is related to celiac. It cleared with a mouth wash after few days. Also having on and off  intermittent rectal bleeding, and she would see it both on tissue and toilet, maybe once every 1-2 months. She is still having 4-5 times BM's in AM   Hx of adenomatous colon polyps. On recall for repeat colonoscopy 7/2025.  She is still not fully gluten-free yet.  Colonoscopy 8/8/22: medium size suppositories  She has not been taking Citrucel.  PCP recommended Rajesh Last appointment - [facility]  Off budesonide 12 months  Colonoscopy due 08/2025  Citrucel daily   Gluten free diet has been effective

## 2024-10-15 ENCOUNTER — OFFICE VISIT (OUTPATIENT)
Dept: URBAN - METROPOLITAN AREA CLINIC 35 | Facility: CLINIC | Age: 74
End: 2024-10-15

## 2024-12-17 ENCOUNTER — OFFICE VISIT (OUTPATIENT)
Dept: URBAN - METROPOLITAN AREA CLINIC 35 | Facility: CLINIC | Age: 74
End: 2024-12-17

## 2024-12-17 VITALS
WEIGHT: 115 LBS | HEIGHT: 61 IN | OXYGEN SATURATION: 99 % | SYSTOLIC BLOOD PRESSURE: 115 MMHG | HEART RATE: 68 BPM | DIASTOLIC BLOOD PRESSURE: 70 MMHG | BODY MASS INDEX: 21.71 KG/M2

## 2024-12-17 RX ORDER — NALTREXONE HYDROCHLORIDE 50 MG/1
1 TABLET TABLET, FILM COATED ORAL ONCE A DAY
Status: ACTIVE | COMMUNITY

## 2024-12-17 RX ORDER — BUPROPION HYDROCHLORIDE 150 MG/1
1 TABLET IN THE MORNING TABLET, FILM COATED, EXTENDED RELEASE ORAL BID
Status: ACTIVE | COMMUNITY

## 2024-12-17 RX ORDER — ALPRAZOLAM 0.25 MG/1
1 TABLET TABLET ORAL
Status: ACTIVE | COMMUNITY

## 2024-12-17 RX ORDER — HYDROCORTISONE ACETATE 25 MG/1
1 SUPPOSITORY SUPPOSITORY RECTAL ONCE A DAY
Status: ACTIVE | COMMUNITY

## 2024-12-17 RX ORDER — CHOLECALCIFEROL (VITAMIN D3) 50 MCG
1 TABLET TABLET ORAL ONCE A DAY
Status: ACTIVE | COMMUNITY

## 2024-12-17 RX ORDER — DENOSUMAB 60 MG/ML
AS DIRECTED INJECTION SUBCUTANEOUS
Status: ON HOLD | COMMUNITY

## 2024-12-17 RX ORDER — TRAZODONE HYDROCHLORIDE 50 MG/1
1 TABLET AT BEDTIME AS NEEDED TABLET ORAL ONCE A DAY
Status: ACTIVE | COMMUNITY

## 2024-12-17 RX ORDER — BUDESONIDE 3 MG/1
3 CAPSULES CAPSULE, COATED PELLETS ORAL ONCE A DAY
Qty: 270 CAPSULE | Refills: 0 | Status: ON HOLD | COMMUNITY
Start: 2022-09-01

## 2024-12-17 RX ORDER — HYDROCORTISONE ACETATE 25 MG/1
1 SUPPOSITORY SUPPOSITORY RECTAL TWICE A DAY
Qty: 20 | Refills: 1

## 2024-12-17 RX ORDER — ROSUVASTATIN 10 MG/1
1 TABLET TABLET, FILM COATED ORAL ONCE A DAY
Status: ACTIVE | COMMUNITY

## 2024-12-17 RX ORDER — ESCITALOPRAM OXALATE 10 MG/1
1 TABLET TABLET ORAL ONCE A DAY
Status: ACTIVE | COMMUNITY

## 2024-12-17 NOTE — HPI-TODAY'S VISIT:
74 year old female patient with previous history of Lymphocytic colitis, celiac disease, IBS-D, Situational anxiety, Adenomatous colonic polyps, Rectal bleeding, Internal hemorrhoids presents today for her 3 month follow-up. Patient mentions she is taking Benefiber with improvement of symptoms. She has been taking Hydrocortisone suppository with improvement of symptoms. Denies taking Imodium or citrucel. Patient currently mentions 3 bowel movements per day with solid consistency without melena or pain with ocasional blood.   Patient is on Benefiber one teaspoon in large bottle of water and she drinks it all day.. Patient is having easy , soft BM's 3 times a day. Diarrhea is only occasional. She is not using Imodium. No BM's in middle of the night.   Last Appointment  OTC Imodium 2 mg 1-2 times a day  Culturelle QD  Gluten free diet  Citrucel QD   COL due 7/2025  Hydrocortisone suppository 25 mg Rectal BID

## 2025-04-15 ENCOUNTER — OFFICE VISIT (OUTPATIENT)
Dept: URBAN - METROPOLITAN AREA CLINIC 35 | Facility: CLINIC | Age: 75
End: 2025-04-15

## 2025-04-15 NOTE — HPI-TODAY'S VISIT:
A 74 year old female patient with previous history of Lymphocytic colitis, Celiac disease, IBS-D, Situational anxiety, Adenomatous colonic polyps, Rectal bleeding, internal hemorrhoids presents today for her 4 month follow-up.   Last Appointment   Benefiber 2tsp QD  HC suppositories refill (Last episode 10/2024)  Colonoscopy due 7/2025

## 2025-05-20 ENCOUNTER — OFFICE VISIT (OUTPATIENT)
Dept: URBAN - METROPOLITAN AREA CLINIC 35 | Facility: CLINIC | Age: 75
End: 2025-05-20

## 2025-06-10 ENCOUNTER — OFFICE VISIT (OUTPATIENT)
Dept: URBAN - METROPOLITAN AREA CLINIC 35 | Facility: CLINIC | Age: 75
End: 2025-06-10
Payer: MEDICARE

## 2025-06-10 ENCOUNTER — LAB OUTSIDE AN ENCOUNTER (OUTPATIENT)
Dept: URBAN - METROPOLITAN AREA CLINIC 35 | Facility: CLINIC | Age: 75
End: 2025-06-10

## 2025-06-10 DIAGNOSIS — K58.0 IRRITABLE BOWEL SYNDROME WITH DIARRHEA: ICD-10-CM

## 2025-06-10 DIAGNOSIS — K90.0 CELIAC DISEASE: ICD-10-CM

## 2025-06-10 DIAGNOSIS — K52.832 LYMPHOCYTIC COLITIS: ICD-10-CM

## 2025-06-10 DIAGNOSIS — Z86.0109 PERSONAL HISTORY OF OTHER COLON POLYPS: ICD-10-CM

## 2025-06-10 PROCEDURE — 99214 OFFICE O/P EST MOD 30 MIN: CPT | Performed by: INTERNAL MEDICINE

## 2025-06-10 RX ORDER — ALPRAZOLAM 0.25 MG/1
1 TABLET TABLET ORAL
Status: ACTIVE | COMMUNITY

## 2025-06-10 RX ORDER — NALTREXONE HYDROCHLORIDE 50 MG/1
1 TABLET TABLET, FILM COATED ORAL ONCE A DAY
Status: ACTIVE | COMMUNITY

## 2025-06-10 RX ORDER — BUPROPION HYDROCHLORIDE 150 MG/1
1 TABLET IN THE MORNING TABLET, FILM COATED, EXTENDED RELEASE ORAL BID
Status: ACTIVE | COMMUNITY

## 2025-06-10 RX ORDER — BUDESONIDE 3 MG/1
3 CAPSULES CAPSULE, COATED PELLETS ORAL ONCE A DAY
Qty: 270 CAPSULE | Refills: 0 | Status: ON HOLD | COMMUNITY
Start: 2022-09-01

## 2025-06-10 RX ORDER — ROSUVASTATIN 10 MG/1
1 TABLET TABLET, FILM COATED ORAL ONCE A DAY
Status: ACTIVE | COMMUNITY

## 2025-06-10 RX ORDER — ESCITALOPRAM OXALATE 10 MG/1
1 TABLET TABLET ORAL ONCE A DAY
Status: ACTIVE | COMMUNITY

## 2025-06-10 RX ORDER — HYDROCORTISONE ACETATE 25 MG/1
1 SUPPOSITORY SUPPOSITORY RECTAL TWICE A DAY
Qty: 20 | Refills: 1 | Status: ON HOLD | COMMUNITY

## 2025-06-10 RX ORDER — TRAZODONE HYDROCHLORIDE 50 MG/1
1 TABLET AT BEDTIME AS NEEDED TABLET ORAL ONCE A DAY
Status: ACTIVE | COMMUNITY

## 2025-06-10 RX ORDER — CHOLECALCIFEROL (VITAMIN D3) 50 MCG
1 TABLET TABLET ORAL ONCE A DAY
Status: ACTIVE | COMMUNITY

## 2025-06-10 RX ORDER — DENOSUMAB 60 MG/ML
AS DIRECTED INJECTION SUBCUTANEOUS
Status: ON HOLD | COMMUNITY

## 2025-06-10 NOTE — HPI-TODAY'S VISIT:
A 74-year-old female patient with previous history of Lymphocytic colitis, Celiac disease, IBS-D, Situational anxiety (sick  at home), Adenomatous colonic polyps, Rectal bleeding, internal hemorrhoids presents today for her 4-month follow-up. Patient admits frequent BM 3-5 a day, without blood, melena, or mucus in stools. Patient admits that stools are more "normal" on Psyllium; 1 tsp is helping stool being more formed. Patient admits using Hemorrhoidal relief cream with significant relief.  She is more gluten conscious than gluten-free.  Patient admits that if she has ice cream, she has a BM; patient unsure if she is lactose intolerant.   Last Appointment   Benefiber 2tsp QD  HC suppositories refill (Last episode 10/2024)  Colonoscopy due 7/2025

## 2025-06-19 ENCOUNTER — TELEPHONE ENCOUNTER (OUTPATIENT)
Dept: URBAN - METROPOLITAN AREA CLINIC 35 | Facility: CLINIC | Age: 75
End: 2025-06-19

## 2025-08-18 ENCOUNTER — CLAIMS CREATED FROM THE CLAIM WINDOW (OUTPATIENT)
Dept: URBAN - METROPOLITAN AREA CLINIC 4 | Facility: CLINIC | Age: 75
End: 2025-08-18
Payer: MEDICARE

## 2025-08-18 ENCOUNTER — CLAIMS CREATED FROM THE CLAIM WINDOW (OUTPATIENT)
Dept: URBAN - METROPOLITAN AREA SURGERY CENTER 8 | Facility: SURGERY CENTER | Age: 75
End: 2025-08-18
Payer: MEDICARE

## 2025-08-18 DIAGNOSIS — K63.5 POLYP OF COLON: ICD-10-CM

## 2025-08-18 DIAGNOSIS — D12.3 ADENOMA OF TRANSVERSE COLON: ICD-10-CM

## 2025-08-18 DIAGNOSIS — D12.3 BENIGN NEOPLASM OF TRANSVERSE COLON: ICD-10-CM

## 2025-08-18 DIAGNOSIS — K62.3 RECTAL PROLAPSE: ICD-10-CM

## 2025-08-18 DIAGNOSIS — K52.832 LYMPHOCYTIC  COLITIS: ICD-10-CM

## 2025-08-18 DIAGNOSIS — Z86.0100 PERSONAL HISTORY OF COLON POLYPS, UNSPECIFIED: ICD-10-CM

## 2025-08-18 DIAGNOSIS — D12.2 ADENOMA OF ASCENDING COLON: ICD-10-CM

## 2025-08-18 DIAGNOSIS — D12.2 BENIGN NEOPLASM OF ASCENDING COLON: ICD-10-CM

## 2025-08-18 DIAGNOSIS — K52.839 MICROSCOPIC COLITIS, UNSPECIFIED: ICD-10-CM

## 2025-08-18 DIAGNOSIS — K63.89 OTHER SPECIFIED DISEASES OF INTESTINE: ICD-10-CM

## 2025-08-18 DIAGNOSIS — K63.5 BENIGN COLON POLYPS: ICD-10-CM

## 2025-08-18 DIAGNOSIS — Z09 ENCOUNTER FOR FOLLOW-UP EXAMINATION AFTER COMPLETED TREATMENT FOR CONDITIONS OTHER THAN MALIGNANT NEOPLASM: ICD-10-CM

## 2025-08-18 DIAGNOSIS — K52.832 LYMPHOCYTIC COLITIS: ICD-10-CM

## 2025-08-18 PROCEDURE — 45385 COLONOSCOPY W/LESION REMOVAL: CPT | Performed by: INTERNAL MEDICINE

## 2025-08-18 PROCEDURE — 0529F INTRVL 3/>YR PTS CLNSCP DOCD: CPT | Performed by: INTERNAL MEDICINE

## 2025-08-18 PROCEDURE — 45380 COLONOSCOPY AND BIOPSY: CPT | Performed by: INTERNAL MEDICINE

## 2025-08-18 PROCEDURE — 00811 ANES LWR INTST NDSC NOS: CPT | Performed by: NURSE ANESTHETIST, CERTIFIED REGISTERED

## 2025-08-18 PROCEDURE — 88342 IMHCHEM/IMCYTCHM 1ST ANTB: CPT | Performed by: PATHOLOGY

## 2025-08-18 PROCEDURE — 88305 TISSUE EXAM BY PATHOLOGIST: CPT | Performed by: PATHOLOGY

## 2025-08-18 PROCEDURE — 88313 SPECIAL STAINS GROUP 2: CPT | Performed by: PATHOLOGY

## 2025-08-18 RX ORDER — DENOSUMAB 60 MG/ML
AS DIRECTED INJECTION SUBCUTANEOUS
Status: ON HOLD | COMMUNITY

## 2025-08-18 RX ORDER — BUPROPION HYDROCHLORIDE 150 MG/1
1 TABLET IN THE MORNING TABLET, FILM COATED, EXTENDED RELEASE ORAL BID
Status: ACTIVE | COMMUNITY

## 2025-08-18 RX ORDER — ESCITALOPRAM OXALATE 10 MG/1
1 TABLET TABLET ORAL ONCE A DAY
Status: ACTIVE | COMMUNITY

## 2025-08-18 RX ORDER — HYDROCORTISONE ACETATE 25 MG/1
1 SUPPOSITORY SUPPOSITORY RECTAL TWICE A DAY
Qty: 20 | Refills: 1 | Status: ON HOLD | COMMUNITY

## 2025-08-18 RX ORDER — ALPRAZOLAM 0.25 MG/1
1 TABLET TABLET ORAL
Status: ACTIVE | COMMUNITY

## 2025-08-18 RX ORDER — BUDESONIDE 3 MG/1
3 CAPSULES CAPSULE, COATED PELLETS ORAL ONCE A DAY
Qty: 270 CAPSULE | Refills: 0 | Status: ON HOLD | COMMUNITY
Start: 2022-09-01

## 2025-08-18 RX ORDER — NALTREXONE HYDROCHLORIDE 50 MG/1
1 TABLET TABLET, FILM COATED ORAL ONCE A DAY
Status: ACTIVE | COMMUNITY

## 2025-08-18 RX ORDER — ROSUVASTATIN 10 MG/1
1 TABLET TABLET, FILM COATED ORAL ONCE A DAY
Status: ACTIVE | COMMUNITY

## 2025-08-18 RX ORDER — CHOLECALCIFEROL (VITAMIN D3) 50 MCG
1 TABLET TABLET ORAL ONCE A DAY
Status: ACTIVE | COMMUNITY

## 2025-08-18 RX ORDER — TRAZODONE HYDROCHLORIDE 50 MG/1
1 TABLET AT BEDTIME AS NEEDED TABLET ORAL ONCE A DAY
Status: ACTIVE | COMMUNITY

## 2025-08-19 ENCOUNTER — TELEPHONE ENCOUNTER (OUTPATIENT)
Dept: URBAN - METROPOLITAN AREA SURGERY CENTER 8 | Facility: SURGERY CENTER | Age: 75
End: 2025-08-19

## 2025-08-19 RX ORDER — HYDROCORTISONE 2.5% 25 MG/G
1 APPLICATION CREAM TOPICAL TWICE A DAY
Qty: 1 | Refills: 1 | OUTPATIENT
Start: 2025-08-19 | End: 2025-09-16